# Patient Record
Sex: FEMALE | Race: WHITE | Employment: FULL TIME | URBAN - METROPOLITAN AREA
[De-identification: names, ages, dates, MRNs, and addresses within clinical notes are randomized per-mention and may not be internally consistent; named-entity substitution may affect disease eponyms.]

---

## 2017-07-28 ENCOUNTER — TRANSFERRED RECORDS (OUTPATIENT)
Dept: HEALTH INFORMATION MANAGEMENT | Facility: CLINIC | Age: 27
End: 2017-07-28

## 2018-08-16 ENCOUNTER — TRANSFERRED RECORDS (OUTPATIENT)
Dept: HEALTH INFORMATION MANAGEMENT | Facility: CLINIC | Age: 28
End: 2018-08-16

## 2018-11-16 ENCOUNTER — TRANSFERRED RECORDS (OUTPATIENT)
Dept: HEALTH INFORMATION MANAGEMENT | Facility: CLINIC | Age: 28
End: 2018-11-16

## 2019-01-03 ENCOUNTER — TRANSFERRED RECORDS (OUTPATIENT)
Dept: HEALTH INFORMATION MANAGEMENT | Facility: CLINIC | Age: 29
End: 2019-01-03

## 2019-06-11 ENCOUNTER — TRANSFERRED RECORDS (OUTPATIENT)
Dept: HEALTH INFORMATION MANAGEMENT | Facility: CLINIC | Age: 29
End: 2019-06-11

## 2019-06-25 ENCOUNTER — TELEPHONE (OUTPATIENT)
Dept: ALLERGY | Facility: OTHER | Age: 29
End: 2019-06-25

## 2019-06-25 ENCOUNTER — OFFICE VISIT (OUTPATIENT)
Dept: OTOLARYNGOLOGY | Facility: OTHER | Age: 29
End: 2019-06-25
Attending: NURSE PRACTITIONER
Payer: COMMERCIAL

## 2019-06-25 VITALS
OXYGEN SATURATION: 98 % | SYSTOLIC BLOOD PRESSURE: 124 MMHG | WEIGHT: 156 LBS | BODY MASS INDEX: 24.48 KG/M2 | DIASTOLIC BLOOD PRESSURE: 78 MMHG | TEMPERATURE: 98.8 F | HEART RATE: 83 BPM | HEIGHT: 67 IN

## 2019-06-25 DIAGNOSIS — R06.7 SNEEZING: ICD-10-CM

## 2019-06-25 DIAGNOSIS — R09.82 PND (POST-NASAL DRIP): ICD-10-CM

## 2019-06-25 DIAGNOSIS — J32.9 CHRONIC SINUSITIS, UNSPECIFIED LOCATION: ICD-10-CM

## 2019-06-25 DIAGNOSIS — R09.81 NASAL CONGESTION: Primary | ICD-10-CM

## 2019-06-25 DIAGNOSIS — J34.2 DNS (DEVIATED NASAL SEPTUM): ICD-10-CM

## 2019-06-25 DIAGNOSIS — J34.89 RHINORRHEA: ICD-10-CM

## 2019-06-25 PROCEDURE — 99203 OFFICE O/P NEW LOW 30 MIN: CPT | Mod: 25 | Performed by: NURSE PRACTITIONER

## 2019-06-25 PROCEDURE — 31231 NASAL ENDOSCOPY DX: CPT | Performed by: NURSE PRACTITIONER

## 2019-06-25 RX ORDER — LEVOCETIRIZINE DIHYDROCHLORIDE 5 MG/1
5 TABLET, FILM COATED ORAL EVERY EVENING
Qty: 30 TABLET | Refills: 11 | Status: SHIPPED | OUTPATIENT
Start: 2019-06-25 | End: 2020-06-24

## 2019-06-25 RX ORDER — BUDESONIDE 0.5 MG/2ML
INHALANT ORAL
Qty: 2 BOX | Refills: 3 | Status: SHIPPED | OUTPATIENT
Start: 2019-06-25

## 2019-06-25 ASSESSMENT — MIFFLIN-ST. JEOR: SCORE: 1480.24

## 2019-06-25 ASSESSMENT — PAIN SCALES - GENERAL: PAINLEVEL: NO PAIN (0)

## 2019-06-25 NOTE — PROGRESS NOTES
Otolaryngology Note         Chief Complaint:     Patient presents with:  Consult: NPT Chronic Sinusitis         History of Present Illness:     Jennifer Emery is a 28 year old female  I was asked to see for evaluation of chronic sinusitis. The patient was sent here by  Kendra Salas.      Sinus issues for 2-3 years, but has worsened since December. In the last 6 months, has received multiple antibiotics for suspected sinus infections.Most recently, she finished doxycycline last week. (slight improvement in sinus symptoms)    Typical Symptoms: Pain/pressure bilateral maxillary sinuses and forehead pain, left sided nasal congestion, left ETD symptoms (popping/sharp pain), rhinorrhea (most often clear) with increased PND, upper molars will hurt (dentist 2 weeks ago, normal exam with no dental concerns), sneezing.    Denies seasonal allergies, however, last spring she also had flare up of her typical symptoms and tried 3 different allergy medications.  (Claritin, Zyrtec, Allegra, no difference).  Benadryl at night time (cuases increased grogginess the next day), PRN Flonase (few times/weeks), PRN neti pot just twice.    Denies past nasal trauma or sinus surgery.  No prior allergy testing.  No new exposures.  Brother with allergies.  House with basement, moved 1-1.5 years ago (symptoms started before). No water or mold issues.  2 dogs.     Sinus CT [facility] 1/3/19  Report notes: Clear paranasal sinuses. Incidental findings of prominent bilateral Ciara cells that do not occlude or significantly narrow the ostiomeatal complexes. Unremarkable CT.          Medications:     Current Outpatient Rx   Medication Sig Dispense Refill     sertraline (ZOLOFT) 50 MG tablet Take 50 mg by mouth daily              Allergies:     Allergies: Patient has no known allergies.          Past Medical History:     History reviewed. No pertinent past medical history.         Past Surgical History:     History reviewed. No pertinent surgical  "history.    ENT family history reviewed         Social History:     Social History     Tobacco Use     Smoking status: Never Smoker     Smokeless tobacco: Never Used   Substance Use Topics     Alcohol use: None     Drug use: None            Review of Systems:     Review Of Systems  Skin: negative  Eyes: negative  Ears/Nose/Throat: nasal congestion, sneezing, postnasal drainage, deviated septum, sinus trouble  Respiratory: No shortness of breath, dyspnea on exertion, cough, or hemoptysis  Cardiovascular: negative  Gastrointestinal: negative  Genitourinary: negative  Musculoskeletal: negative  Neurologic: negative  Psychiatric: negative  Hematologic/Lymphatic/Immunologic: negative  Endocrine: negative         Physical Exam:     /78   Pulse 83   Temp 98.8  F (37.1  C) (Tympanic)   Ht 1.702 m (5' 7\")   Wt 70.8 kg (156 lb)   SpO2 98%   BMI 24.43 kg/m    General - The patient is well nourished and well developed, and appears to have good nutritional status.  Alert and oriented to person and place, answers questions and cooperates with examination appropriately.   Head and Face - Normocephalic and atraumatic, with no gross asymmetry noted.  The facial nerve is intact, with strong symmetric movements.  Voice and Breathing - The patient was breathing comfortably without the use of accessory muscles. There was no wheezing, stridor. The patients voice was clear and strong, and had appropriate pitch and quality.  Ears - External ear normal. Canals are patent. Right tympanic membrane is intact without effusion, retraction or mass. Left tympanic membrane is intact without effusion, retraction or mass.  Eyes - Extraocular movements intact, and the pupils were reactive to light. Sclera were not icteric or injected, conjunctiva were pink and moist.  Mouth - Examination of the oral cavity showed pink, healthy oral mucosa. Dentition in good condition. No lesions or ulcerations noted. The tongue was mobile and midline. "   Throat - The walls of the oropharynx were smooth, pink, moist, symmetric, and had no lesions or ulcerations.  The tonsillar pillars and soft palate were symmetric. The uvula was midline on elevation.    Neck - Normal midline excursion of the laryngotracheal complex during swallowing.  Full range of motion on passive movement.  Palpation of the occipital, submental, submandibular, internal jugular chain, and supraclavicular nodes did not demonstrate any abnormal lymph nodes or masses.  Palpation of the thyroid was soft and smooth, with no nodules or goiter appreciated.  The trachea was mobile and midline.  Nose - External contour is symmetric, no gross deflection or scars.  Nasal mucosa is pink and moist with no abnormal mucus.      To further evaluate the nasal cavity, I performed rigid nasal endoscopy.  I first sprayed the nasal cavity bilaterally with a mix of lidocaine and neosynephrine.  I used a 2.7mm,   30 degree rigid nasal endoscope for examination.    Septum with mid DNS right and anterior DNS left. Normal appearing nasal mucosa.    Left side:    Inferior turbinate hypertrophy.  The left middle turbinate and middle meatus were clearly visualized and normal in appearance.  Going further back, the sphenoethmoid and frontal recess were normal in appearance.  The nasopharynx was unremarkable, and the eustachian tube opening on this side was unobstructed.  No abnormal secretions, purulence, or polyps were noted.    Right side:    Inferior turbinate hypertrophy, less than the left.   Limited view due to anatomy and discomfort of the right middle meatus. Middle turbinate and middle meatus appeared intact with no polyps or purulence.  Could not view beyond the middle meatus.     No purulence or polypoid tissue.          Assessment and Plan:       ICD-10-CM    1. Nasal congestion R09.81 budesonide (PULMICORT) 0.5 MG/2ML neb solution     levocetirizine (XYZAL) 5 MG tablet   2. DNS (deviated nasal septum), mild  right mid and mild left anterior deviations J34.2    3. Sneezing R06.7    4. Rhinorrhea J34.89    5. PND (post-nasal drip) R09.82    6. Chronic sinusitis, unspecified location J32.9      CT sinus report and imaging reviewed today (is in PACS). No significant findings in sinus CT.    Discussed this is likely allergy related.    Start Xyzal (over the counter antihistamine)  Continue Flonase    Budesonide nasal saline irrigation per instructions:  -Obtain Leo Med Sinus rinse over the counter.    -Use warm distilled water and 2 packets of the salt solution that comes with the bottle, dissolve in bottle up to the 240 mL lucy.  -Add 1 vial of budesonide.  -Irrigate each side of your nose leaning over the sink, using 1/2 the volume of the bottle in each nostril every irrigation.  Irrigate 2 times daily.  -If additional rinses are needed/recommended, you may use the plain Leo Med Sinus irrigation without the use of added budesonide.     Allergen avoidance measures were discussed and are important in preventing symptoms from occurring or worsening.  Follow up for allergy testing as recommended.  This may be a blood test (mRAST) or skin testing (modified quantitative testing).  Indications for allergy testing include:   1) Confirm suspicion of allergic rhinitis due to inhalant allergies  2) Identify the offending allergen to determine specific mode of treatment  3) In the case of chronic rhinosinusitis: when symptoms are not controlled by avoidance and pharmacotherapy  4) In the Asthma patient when exacerbations may be due to perennial allergen exposure  5) Suspect food allergy  6) Otitis Media, chronic rhinitis, atopic dermatitis, Meniere disease, headache, pharyngitis or eye symptoms    Leo Med sinus irrigation BID and PRN  Daily antihistamine and nasal steroid (stop antihistamine 5 days prior to testing)  Food/allergy cross reactivity education provided.    Signed consent was obtained, and the risks of immunotherapy  were discussed, including the potential for anaphylaxis.      I will see her after allergy testing and encouraged her to follow-up sooner as needed.    30 min spent in direct face to face time with this pt, greater than 50% in counseling and coordination of care including results of imaging, workup and treatment of inhalent allergies, future plans including possible immunotherapy.    Elena Brown NP  ENT  [facility], [place]  [phone]

## 2019-06-25 NOTE — TELEPHONE ENCOUNTER
Attempted to reach patient regarding MQT allergy testing, no answer.  Left message for patient to return call.    Trinity Vides RN

## 2019-06-25 NOTE — NURSING NOTE
"Chief Complaint   Patient presents with     Consult     NPT Chronic Sinusitis       Initial /78   Pulse 83   Temp 98.8  F (37.1  C) (Tympanic)   Ht 1.702 m (5' 7\")   Wt 70.8 kg (156 lb)   SpO2 98%   BMI 24.43 kg/m   Estimated body mass index is 24.43 kg/m  as calculated from the following:    Height as of this encounter: 1.702 m (5' 7\").    Weight as of this encounter: 70.8 kg (156 lb).  Medication Reconciliation: complete    Jaclyn Hamilton LPN       "

## 2019-06-25 NOTE — LETTER
6/25/2019         RE: Jennifer Emery  [address]  [place] [address]        Dear Colleague,    Thank you for referring your patient, Jennifer Emery, to the [facility]. Please see a copy of my visit note below.    Otolaryngology Note         Chief Complaint:     Patient presents with:  Consult: NPT Chronic Sinusitis         History of Present Illness:     Jennifer Emery is a 28 year old female  I was asked to see for evaluation of chronic sinusitis. The patient was sent here by  Kendra Salas.      Sinus issues for 2-3 years, but has worsened since December. In the last 6 months, has received multiple antibiotics for suspected sinus infections.Most recently, she finished doxycycline last week. (slight improvement in sinus symptoms)    Typical Symptoms: Pain/pressure bilateral maxillary sinuses and forehead pain, left sided nasal congestion, left ETD symptoms (popping/sharp pain), rhinorrhea (most often clear) with increased PND, upper molars will hurt (dentist 2 weeks ago, normal exam with no dental concerns), sneezing.    Denies seasonal allergies, however, last spring she also had flare up of her typical symptoms and tried 3 different allergy medications.  (Claritin, Zyrtec, Allegra, no difference).  Benadryl at night time (cuases increased grogginess the next day), PRN Flonase (few times/weeks), PRN neti pot just twice.    Denies past nasal trauma or sinus surgery.  No prior allergy testing.  No new exposures.  Brother with allergies.  House with basement, moved 1-1.5 years ago (symptoms started before). No water or mold issues.  2 dogs.     Sinus CT [facility] 1/3/19  Report notes: Clear paranasal sinuses. Incidental findings of prominent bilateral Ciara cells that do not occlude or significantly narrow the ostiomeatal complexes. Unremarkable CT.          Medications:     Current Outpatient Rx   Medication Sig Dispense Refill     sertraline (ZOLOFT) 50 MG tablet Take 50 mg by  "mouth daily              Allergies:     Allergies: Patient has no known allergies.          Past Medical History:     History reviewed. No pertinent past medical history.         Past Surgical History:     History reviewed. No pertinent surgical history.    ENT family history reviewed         Social History:     Social History     Tobacco Use     Smoking status: Never Smoker     Smokeless tobacco: Never Used   Substance Use Topics     Alcohol use: None     Drug use: None            Review of Systems:     Review Of Systems  Skin: negative  Eyes: negative  Ears/Nose/Throat: nasal congestion, sneezing, postnasal drainage, deviated septum, sinus trouble  Respiratory: No shortness of breath, dyspnea on exertion, cough, or hemoptysis  Cardiovascular: negative  Gastrointestinal: negative  Genitourinary: negative  Musculoskeletal: negative  Neurologic: negative  Psychiatric: negative  Hematologic/Lymphatic/Immunologic: negative  Endocrine: negative         Physical Exam:     /78   Pulse 83   Temp 98.8  F (37.1  C) (Tympanic)   Ht 1.702 m (5' 7\")   Wt 70.8 kg (156 lb)   SpO2 98%   BMI 24.43 kg/m     General - The patient is well nourished and well developed, and appears to have good nutritional status.  Alert and oriented to person and place, answers questions and cooperates with examination appropriately.   Head and Face - Normocephalic and atraumatic, with no gross asymmetry noted.  The facial nerve is intact, with strong symmetric movements.  Voice and Breathing - The patient was breathing comfortably without the use of accessory muscles. There was no wheezing, stridor. The patients voice was clear and strong, and had appropriate pitch and quality.  Ears - External ear normal. Canals are patent. Right tympanic membrane is intact without effusion, retraction or mass. Left tympanic membrane is intact without effusion, retraction or mass.  Eyes - Extraocular movements intact, and the pupils were reactive to " light. Sclera were not icteric or injected, conjunctiva were pink and moist.  Mouth - Examination of the oral cavity showed pink, healthy oral mucosa. Dentition in good condition. No lesions or ulcerations noted. The tongue was mobile and midline.   Throat - The walls of the oropharynx were smooth, pink, moist, symmetric, and had no lesions or ulcerations.  The tonsillar pillars and soft palate were symmetric. The uvula was midline on elevation.    Neck - Normal midline excursion of the laryngotracheal complex during swallowing.  Full range of motion on passive movement.  Palpation of the occipital, submental, submandibular, internal jugular chain, and supraclavicular nodes did not demonstrate any abnormal lymph nodes or masses.  Palpation of the thyroid was soft and smooth, with no nodules or goiter appreciated.  The trachea was mobile and midline.  Nose - External contour is symmetric, no gross deflection or scars.  Nasal mucosa is pink and moist with no abnormal mucus.      To further evaluate the nasal cavity, I performed rigid nasal endoscopy.  I first sprayed the nasal cavity bilaterally with a mix of lidocaine and neosynephrine.  I used a 2.7mm,   30 degree rigid nasal endoscope for examination.    Septum with mid DNS right and anterior DNS left. Normal appearing nasal mucosa.    Left side:    Inferior turbinate hypertrophy.  The left middle turbinate and middle meatus were clearly visualized and normal in appearance.  Going further back, the sphenoethmoid and frontal recess were normal in appearance.  The nasopharynx was unremarkable, and the eustachian tube opening on this side was unobstructed.  No abnormal secretions, purulence, or polyps were noted.    Right side:    Inferior turbinate hypertrophy, less than the left.   Limited view due to anatomy and discomfort of the right middle meatus. Middle turbinate and middle meatus appeared intact with no polyps or purulence.  Could not view beyond the middle  meatus.     No purulence or polypoid tissue.          Assessment and Plan:       ICD-10-CM    1. Nasal congestion R09.81 budesonide (PULMICORT) 0.5 MG/2ML neb solution     levocetirizine (XYZAL) 5 MG tablet   2. DNS (deviated nasal septum), mild right mid and mild left anterior deviations J34.2    3. Sneezing R06.7    4. Rhinorrhea J34.89    5. PND (post-nasal drip) R09.82    6. Chronic sinusitis, unspecified location J32.9      CT sinus report and imaging reviewed today (is in PACS). No significant findings in sinus CT.    Discussed this is likely allergy related.    Start Xyzal (over the counter antihistamine)  Continue Flonase    Budesonide nasal saline irrigation per instructions:  -Obtain Leo Med Sinus rinse over the counter.    -Use warm distilled water and 2 packets of the salt solution that comes with the bottle, dissolve in bottle up to the 240 mL lucy.  -Add 1 vial of budesonide.  -Irrigate each side of your nose leaning over the sink, using 1/2 the volume of the bottle in each nostril every irrigation.  Irrigate 2 times daily.  -If additional rinses are needed/recommended, you may use the plain Leo Med Sinus irrigation without the use of added budesonide.     Allergen avoidance measures were discussed and are important in preventing symptoms from occurring or worsening.  Follow up for allergy testing as recommended.  This may be a blood test (mRAST) or skin testing (modified quantitative testing).  Indications for allergy testing include:   1) Confirm suspicion of allergic rhinitis due to inhalant allergies  2) Identify the offending allergen to determine specific mode of treatment  3) In the case of chronic rhinosinusitis: when symptoms are not controlled by avoidance and pharmacotherapy  4) In the Asthma patient when exacerbations may be due to perennial allergen exposure  5) Suspect food allergy  6) Otitis Media, chronic rhinitis, atopic dermatitis, Meniere disease, headache, pharyngitis or eye  symptoms    Leo Med sinus irrigation BID and PRN  Daily antihistamine and nasal steroid (stop antihistamine 5 days prior to testing)  Food/allergy cross reactivity education provided.    Signed consent was obtained, and the risks of immunotherapy were discussed, including the potential for anaphylaxis.      I will see her after allergy testing and encouraged her to follow-up sooner as needed.    30 min spent in direct face to face time with this pt, greater than 50% in counseling and coordination of care including results of imaging, workup and treatment of inhalent allergies, future plans including possible immunotherapy.    Elena Brown NP  ENT  [facility]  [phone]      Again, thank you for allowing me to participate in the care of your patient.        Sincerely,        Elena Brown, PETRA CNP

## 2019-06-25 NOTE — PATIENT INSTRUCTIONS
Thank you for allowing Elena Brown CNP and our ENT team to participate in your care.  If your medications are too expensive or if there are any questions or concerns with your medication, please give the nurse a call.  If you have a scheduling or an appointment question please contact our Ear/Nose/Throat/Allergy Health Unit Coordinator at their direct line [phone].   ALL nursing questions or concerns can be directed to your ENT nurse at: [phone]- [place]    Start Xyzal (over the counter antihistamine)  Continue Flonase    Budesonide nasal saline irrigation per instructions:  -Obtain Leo Med Sinus rinse over the counter.    -Use warm distilled water and 2 packets of the salt solution that comes with the bottle, dissolve in bottle up to the 240 mL lucy.  -Add 1 vial of budesonide.  -Irrigate each side of your nose leaning over the sink, using 1/2 the volume of the bottle in each nostril every irrigation.  Irrigate 2 times daily.  -If additional rinses are needed/recommended, you may use the plain Leo Med Sinus irrigation without the use of added budesonide.     Complete allergy testing. Nurse will call you to go over information and to set up appointment.

## 2019-06-26 NOTE — TELEPHONE ENCOUNTER
I spoke with the patient today regarding allergy skin testing.    All general instructions are reviewed with the patient.      The patient is made aware of all medications that need to be held prior to testing, and will stop medications as directed per instructions.  The patient verbalized understanding and agree with plan.      Should the patient be given any additional medications prior to the day of testing, they are told to let the provider know that they are going to be allergy tested, so medications that need to be help prior to MQT are not prescribed.      An appointment will be arranged by the ENT [facility] for testing date and time.     This message is forwarded to the [facility] to arrange for an appointment. Written instructions are mailed to the patient as well,  by the ENT [facility].  Lorenza Antunez RN

## 2019-08-14 ENCOUNTER — TELEPHONE (OUTPATIENT)
Dept: ALLERGY | Facility: OTHER | Age: 29
End: 2019-08-14

## 2019-08-14 DIAGNOSIS — J32.9 CHRONIC SINUSITIS, UNSPECIFIED LOCATION: Primary | ICD-10-CM

## 2019-08-14 DIAGNOSIS — R09.82 PND (POST-NASAL DRIP): ICD-10-CM

## 2019-08-14 DIAGNOSIS — J34.89 RHINORRHEA: ICD-10-CM

## 2019-08-14 NOTE — TELEPHONE ENCOUNTER
Patient left a voicemail stating she just found out she is pregnant and wondered if she can still be allergy tested.  Spoke with patient and advised she should not be tested because of the possibility of an anaphlylactic reaction.  Advised patient can have a RAST test done, which would at least give an indication of possible allergies, although not as good of a test as skin testing.  Or patient can consider skin testing after her pregnancy.  Patient would like to have the RAST test done.  Advised the order will be placed and patient can stop by the lab at her convenience.  Also advised it will take approximately 10-14 days for the results to be available.  Patient verbalized understanding.    Trinity Vides RN